# Patient Record
Sex: FEMALE | Race: WHITE | URBAN - METROPOLITAN AREA
[De-identification: names, ages, dates, MRNs, and addresses within clinical notes are randomized per-mention and may not be internally consistent; named-entity substitution may affect disease eponyms.]

---
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
## 2024-03-07 ENCOUNTER — PROCEDURE VISIT (OUTPATIENT)
Dept: SPORTS MEDICINE | Age: 19
End: 2024-03-07

## 2024-03-07 DIAGNOSIS — M25.571 CHRONIC PAIN OF RIGHT ANKLE: Primary | ICD-10-CM

## 2024-03-07 DIAGNOSIS — G89.29 CHRONIC PAIN OF RIGHT ANKLE: Primary | ICD-10-CM

## 2024-03-07 NOTE — PROGRESS NOTES
Athletic Training  Date of Report: 3/7/2024  Name: Eve Avila  School: [employer]  Sport:  Running  : 2005  Age: 19 y.o.  MRN: <[id number]>  Encounter:  [x] New AT Eval     [] Follow-Up Visit    [] Other:   SUBJECTIVE:  Reason for Visit:    Eve Avila is a 19 y.o. year old, female who presents today for evaluation of athletic injury involving right ankle. 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 Onset of the injury began  2 weeks ago  and injury occurred during practice. Running and slipped on ice. Current pain and symptoms include: shooting and throbbing.  Her pain is mainly with WB, no pain to touch.  Current level of pain is a 2 at 7 at the worst. Symptoms have been recurrent since that time. Symptoms improve with rest, ice, and medication: advil . (Not consistent) Symptoms worsen with running. The ankle has not given out or felt unstable. Associated sounds or feelings at time of injury included: none. Treatment to date has included: none. Treatment has been N/A. Previous history of injury involving right ankle, includes:  After her half marathon in 2023, the next day she had sharp pain for a while and then it went away after she stuck to using the bike for awhile .  With this new injury that happened 2 weeks ago, Virginia says that her ankle does not bother her doing speed, but it hurts when doing an easy pace run and it hurts afterwards also.   OBJECTIVE:   Physical Exam  Vital Signs:   [x] There were no vitals taken for this visit  Date/Time Taken         Blood Pressure         Pulse          Constitution:   Appearance: Eve Avila is [x] alert, [x] appears stated age, and [x] in no distress.                         Eve Avila general body habitus is:    [] Cachectic [] Thin [x] Normal [] Obese [] Morbidly Obese  Pulmonary: Rate   [] Fast [x] Normal [] Slow    Rhythm  [x] Regular [] Irregular   Volume [x] Adequate  []

## 2024-03-13 ENCOUNTER — PROCEDURE VISIT (OUTPATIENT)
Dept: SPORTS MEDICINE | Age: 19
End: 2024-03-13

## 2024-03-13 DIAGNOSIS — S86.301D INJURY OF PERONEAL TENDON OF RIGHT FOOT, SUBSEQUENT ENCOUNTER: Primary | ICD-10-CM

## 2024-03-13 NOTE — PROGRESS NOTES
Athletic Training  Date of Report: 3/13/2024  Name: Eve Avila  School: [employer]  Sport:  Running  : 2005  Age: 19 y.o.  MRN: <[id number]>  Encounter:  [x] New AT Eval     [] Follow-Up Visit    [] Other:   SUBJECTIVE:  Reason for Visit:    Eve Avila is a 19 y.o. year old female who presents today to begin therapeutic exercises for her right foot/ankle. She notes she is not currently in any pain, but she would like to stregthen her foot/ankle to prevent future pain due to a history of pain with insidious onset. When she experiences pain, it is along the peroneus longus muscle.   OBJECTIVE:   Physical Exam  Unchanged      ASSESSMENT:   Diagnosis Orders   1. Injury of peroneal tendon of right foot, subsequent encounter          Clinical Impression: Peroneal Muscle Strain  Status: As Tolerated  Est. Time Missed: None  PLAN:  Treatment:  Today Virginia completed the following exercises:   Towel Scrunches x5   [place] Pick Ups x2   Toe Isolation 2x10 each   Towel Wipers 3x10   SL Balance 3x30 Seconds   SL Calf Raise 3x10    Virginia was able to  complete all exercises without issue.    Comments / Instructions: I have given Virginia a HEP and would like her to do the exercises once daily. Follow up in [facility]TR next week to progress exercises. Reach out to us sooner if pain returns.  Follow-Up Care / Instructions:   Discharged: No  Electronically Signed By: Analy Hill, ATR, LAT, ATC